# Patient Record
Sex: MALE | Race: WHITE | HISPANIC OR LATINO
[De-identification: names, ages, dates, MRNs, and addresses within clinical notes are randomized per-mention and may not be internally consistent; named-entity substitution may affect disease eponyms.]

---

## 2018-04-02 PROBLEM — E78.00 HYPERCHOLESTEREMIA: Status: ACTIVE | Noted: 2018-04-02

## 2018-04-02 PROBLEM — R07.89 OTHER CHEST PAIN: Status: RESOLVED | Noted: 2018-04-02 | Resolved: 2018-04-02

## 2018-04-02 PROBLEM — R07.89 OTHER CHEST PAIN: Status: ACTIVE | Noted: 2018-04-02

## 2018-04-09 ENCOUNTER — HOSPITAL (OUTPATIENT)
Dept: OTHER | Age: 53
End: 2018-04-09

## 2018-04-09 ENCOUNTER — IMAGING SERVICES (OUTPATIENT)
Dept: OTHER | Age: 53
End: 2018-04-09

## 2018-07-27 PROBLEM — R39.15 BENIGN PROSTATIC HYPERPLASIA (BPH) WITH URINARY URGENCY: Status: ACTIVE | Noted: 2018-07-27

## 2018-07-27 PROBLEM — N40.1 BENIGN PROSTATIC HYPERPLASIA (BPH) WITH URINARY URGENCY: Status: ACTIVE | Noted: 2018-07-27

## 2018-07-27 PROBLEM — H83.3X3: Status: ACTIVE | Noted: 2018-07-27

## 2018-07-28 PROBLEM — D17.1 LIPOMA OF CHEST WALL: Status: ACTIVE | Noted: 2018-07-28

## 2018-12-20 PROCEDURE — 86141 C-REACTIVE PROTEIN HS: CPT | Performed by: INTERNAL MEDICINE

## 2019-05-04 PROBLEM — M26.623 BILATERAL TEMPOROMANDIBULAR JOINT PAIN: Status: ACTIVE | Noted: 2019-05-04

## 2020-12-03 PROBLEM — L64.9 MALE PATTERN BALDNESS: Status: ACTIVE | Noted: 2020-12-03

## 2020-12-03 PROBLEM — M47.812 CERVICAL SPONDYLOSIS: Status: ACTIVE | Noted: 2020-12-03

## 2021-01-04 PROBLEM — G47.33 OSA (OBSTRUCTIVE SLEEP APNEA): Status: ACTIVE | Noted: 2021-01-04

## 2021-12-27 PROBLEM — H83.3X3: Status: RESOLVED | Noted: 2018-07-27 | Resolved: 2021-12-27

## 2021-12-27 PROBLEM — L64.9 MALE PATTERN BALDNESS: Status: RESOLVED | Noted: 2020-12-03 | Resolved: 2021-12-27

## 2021-12-27 PROBLEM — D17.1 LIPOMA OF CHEST WALL: Status: RESOLVED | Noted: 2018-07-28 | Resolved: 2021-12-27

## 2021-12-27 PROBLEM — M26.623 BILATERAL TEMPOROMANDIBULAR JOINT PAIN: Status: RESOLVED | Noted: 2019-05-04 | Resolved: 2021-12-27

## 2024-09-17 ENCOUNTER — HOSPITAL ENCOUNTER (EMERGENCY)
Facility: HOSPITAL | Age: 59
Discharge: HOME | End: 2024-09-17
Attending: EMERGENCY MEDICINE
Payer: COMMERCIAL

## 2024-09-17 ENCOUNTER — APPOINTMENT (OUTPATIENT)
Dept: MRI IMAGING | Facility: HOSPITAL | Age: 59
End: 2024-09-17
Attending: EMERGENCY MEDICINE
Payer: COMMERCIAL

## 2024-09-17 ENCOUNTER — APPOINTMENT (OUTPATIENT)
Dept: CT IMAGING | Facility: HOSPITAL | Age: 59
End: 2024-09-17
Attending: EMERGENCY MEDICINE
Payer: COMMERCIAL

## 2024-09-17 ENCOUNTER — APPOINTMENT (OUTPATIENT)
Dept: GENERAL RADIOLOGY | Facility: HOSPITAL | Age: 59
End: 2024-09-17
Attending: EMERGENCY MEDICINE
Payer: COMMERCIAL

## 2024-09-17 VITALS
SYSTOLIC BLOOD PRESSURE: 127 MMHG | DIASTOLIC BLOOD PRESSURE: 86 MMHG | HEART RATE: 57 BPM | BODY MASS INDEX: 23.7 KG/M2 | OXYGEN SATURATION: 98 % | WEIGHT: 160 LBS | HEIGHT: 69 IN | RESPIRATION RATE: 17 BRPM | TEMPERATURE: 97 F

## 2024-09-17 DIAGNOSIS — H53.2 DIPLOPIA: Primary | ICD-10-CM

## 2024-09-17 LAB
ALBUMIN SERPL-MCNC: 4.3 G/DL (ref 3.2–4.8)
ALBUMIN/GLOB SERPL: 1.7 {RATIO} (ref 1–2)
ALP LIVER SERPL-CCNC: 65 U/L
ALT SERPL-CCNC: 30 U/L
ANION GAP SERPL CALC-SCNC: 6 MMOL/L (ref 0–18)
AST SERPL-CCNC: 26 U/L (ref ?–34)
BASOPHILS # BLD AUTO: 0.04 X10(3) UL (ref 0–0.2)
BASOPHILS NFR BLD AUTO: 0.8 %
BILIRUB SERPL-MCNC: 0.5 MG/DL (ref 0.2–1.1)
BUN BLD-MCNC: 19 MG/DL (ref 9–23)
CALCIUM BLD-MCNC: 9.6 MG/DL (ref 8.7–10.4)
CHLORIDE SERPL-SCNC: 109 MMOL/L (ref 98–112)
CO2 SERPL-SCNC: 27 MMOL/L (ref 21–32)
CREAT BLD-MCNC: 0.85 MG/DL
EGFRCR SERPLBLD CKD-EPI 2021: 99 ML/MIN/1.73M2 (ref 60–?)
EOSINOPHIL # BLD AUTO: 0.16 X10(3) UL (ref 0–0.7)
EOSINOPHIL NFR BLD AUTO: 3.2 %
ERYTHROCYTE [DISTWIDTH] IN BLOOD BY AUTOMATED COUNT: 13.4 %
GLOBULIN PLAS-MCNC: 2.5 G/DL (ref 2–3.5)
GLUCOSE BLD-MCNC: 89 MG/DL (ref 70–99)
HCT VFR BLD AUTO: 39.9 %
HGB BLD-MCNC: 13.9 G/DL
IMM GRANULOCYTES # BLD AUTO: 0.01 X10(3) UL (ref 0–1)
IMM GRANULOCYTES NFR BLD: 0.2 %
LYMPHOCYTES # BLD AUTO: 1.63 X10(3) UL (ref 1–4)
LYMPHOCYTES NFR BLD AUTO: 32.4 %
MCH RBC QN AUTO: 31.2 PG (ref 26–34)
MCHC RBC AUTO-ENTMCNC: 34.8 G/DL (ref 31–37)
MCV RBC AUTO: 89.7 FL
MONOCYTES # BLD AUTO: 0.5 X10(3) UL (ref 0.1–1)
MONOCYTES NFR BLD AUTO: 9.9 %
NEUTROPHILS # BLD AUTO: 2.69 X10 (3) UL (ref 1.5–7.7)
NEUTROPHILS # BLD AUTO: 2.69 X10(3) UL (ref 1.5–7.7)
NEUTROPHILS NFR BLD AUTO: 53.5 %
OSMOLALITY SERPL CALC.SUM OF ELEC: 296 MOSM/KG (ref 275–295)
PLATELET # BLD AUTO: 178 10(3)UL (ref 150–450)
POTASSIUM SERPL-SCNC: 4 MMOL/L (ref 3.5–5.1)
PROT SERPL-MCNC: 6.8 G/DL (ref 5.7–8.2)
RBC # BLD AUTO: 4.45 X10(6)UL
SODIUM SERPL-SCNC: 142 MMOL/L (ref 136–145)
TROPONIN I SERPL HS-MCNC: 3 NG/L
WBC # BLD AUTO: 5 X10(3) UL (ref 4–11)

## 2024-09-17 PROCEDURE — 99285 EMERGENCY DEPT VISIT HI MDM: CPT

## 2024-09-17 PROCEDURE — 70496 CT ANGIOGRAPHY HEAD: CPT | Performed by: EMERGENCY MEDICINE

## 2024-09-17 PROCEDURE — 84484 ASSAY OF TROPONIN QUANT: CPT | Performed by: EMERGENCY MEDICINE

## 2024-09-17 PROCEDURE — 71045 X-RAY EXAM CHEST 1 VIEW: CPT | Performed by: EMERGENCY MEDICINE

## 2024-09-17 PROCEDURE — 96374 THER/PROPH/DIAG INJ IV PUSH: CPT

## 2024-09-17 PROCEDURE — 93005 ELECTROCARDIOGRAM TRACING: CPT

## 2024-09-17 PROCEDURE — A9575 INJ GADOTERATE MEGLUMI 0.1ML: HCPCS | Performed by: EMERGENCY MEDICINE

## 2024-09-17 PROCEDURE — 70553 MRI BRAIN STEM W/O & W/DYE: CPT | Performed by: EMERGENCY MEDICINE

## 2024-09-17 PROCEDURE — 80053 COMPREHEN METABOLIC PANEL: CPT | Performed by: EMERGENCY MEDICINE

## 2024-09-17 PROCEDURE — 93010 ELECTROCARDIOGRAM REPORT: CPT

## 2024-09-17 PROCEDURE — 85025 COMPLETE CBC W/AUTO DIFF WBC: CPT | Performed by: EMERGENCY MEDICINE

## 2024-09-17 RX ORDER — GADOTERATE MEGLUMINE 376.9 MG/ML
15 INJECTION INTRAVENOUS
Status: COMPLETED | OUTPATIENT
Start: 2024-09-17 | End: 2024-09-17

## 2024-09-17 RX ORDER — DIAZEPAM 10 MG/2ML
5 INJECTION, SOLUTION INTRAMUSCULAR; INTRAVENOUS ONCE
Status: COMPLETED | OUTPATIENT
Start: 2024-09-17 | End: 2024-09-17

## 2024-09-17 RX ORDER — DIAZEPAM 5 MG
5 TABLET ORAL ONCE
Status: DISCONTINUED | OUTPATIENT
Start: 2024-09-17 | End: 2024-09-17

## 2024-09-18 LAB
ATRIAL RATE: 55 BPM
P AXIS: 32 DEGREES
P-R INTERVAL: 140 MS
Q-T INTERVAL: 382 MS
QRS DURATION: 100 MS
QTC CALCULATION (BEZET): 365 MS
R AXIS: -9 DEGREES
T AXIS: 29 DEGREES
VENTRICULAR RATE: 55 BPM

## 2024-09-18 NOTE — ED INITIAL ASSESSMENT (HPI)
Patient reports double vision since 1200pm. Denies any chest pain, shortness of breath. Patient reports when it happened he was underneath the car working and was straining his neck. No numbness or tingling. No difficulty speaking or difficulty with ambulation.

## 2024-09-18 NOTE — DISCHARGE INSTRUCTIONS
Return for new or worsening symptoms such as headache, further vision changes, difficulty with speech

## 2024-09-18 NOTE — ED PROVIDER NOTES
Patient Seen in: [facility] Emergency Department      History     Chief Complaint   Patient presents with    Pain     Stated Complaint: right neck pain, double vision since 1200    Subjective:   HPI    59-year-old with a history of sleep apnea presents for evaluation of double vision  Patient states that he developed  double visio today. Has been constant all day. No HA. Does have neck pain.  No trouble with speech. No weakness or numbness in arms or legs. No shortness of breath; had some \"slight\" chest elliot in this anterior chest on the way home from work today - lasted a minutes and then resolved.   Recent records reviewed including office visit from 8/30/2024.  Patient seen for flulike symptoms and intermittent chest pain.  Objective:   Past Medical History:    KULWANT (obstructive sleep apnea)    AHI 23 Supine AHI 27 Non-supine AHI 0 O2 Albin 78%              Past Surgical History:   Procedure Laterality Date    Appendectomy  1979    Other surgical history                  Social History     Socioeconomic History    Marital status:    Tobacco Use    Smoking status: Never    Smokeless tobacco: Never   Substance and Sexual Activity    Alcohol use: Yes    Drug use: Never              Review of Systems    Positive for stated Chief Complaint: Pain    Other systems are as noted in HPI.  Constitutional and vital signs reviewed.      All other systems reviewed and negative except as noted above.    Physical Exam     ED Triage Vitals [09/17/24 1953]   BP (!) 172/76   Pulse 60   Resp 16   Temp 97 °F (36.1 °C)   Temp src    SpO2 97 %   O2 Device None (Room air)       Current Vitals:   Vital Signs  BP: 127/86  Pulse: 57  Resp: 17  Temp: 97 °F (36.1 °C)  MAP (mmHg): 99    Oxygen Therapy  SpO2: 98 %  O2 Device: None (Room air)            Physical Exam    General: Patient is awake, alert in no acute distress.   HEENT:  Pupils are PERRL. Extraocular muscles are intact.  Sclera are not icteric.  Conjunctivae within  normal limits.  Mucous members are moist.   Cardiovascular: Regular rate and rhythm, normal S1-S2.  Respiratory: Lungs are clear to auscultation bilaterally.   Extremities: No edema.  Neuro: Cranial nerves II through XII are intact bilaterally.  No dysarthria or aphasia.  Visual fields intact.  No pronator drift.  Normal finger-nose-finger.  Normal strength and sensation bilateral UE and LE.  Patient is alert and answers questions appropriately    ED Course     Labs Reviewed   COMP METABOLIC PANEL (14) - Abnormal; Notable for the following components:       Result Value    Calculated Osmolality 296 (*)     All other components within normal limits   TROPONIN I HIGH SENSITIVITY - Normal   CBC WITH DIFFERENTIAL WITH PLATELET   RAINBOW DRAW BLUE     EKG    Rate, intervals and axes as noted on EKG Report.  Rate: 55  Rhythm: Sinus Rhythm  Reading: No ST elevation or depression.  No dysrhythmia.         X-ray: I personally reviewed the radiographs and my individual interpretation shows no consolidation.  I also reviewed the official report which showed linear opacity right lower lobe likely atelectasis.  CTA brain: No acute process  MRI brain: No acute findings.  Small area of encephalomalacia in the left parietal lobe                 MDM      Previous records reviewed as noted in HPI    Differential includes, but is not limited to, stroke, intracranial mass, TIA    Review of any laboratory testing: CBC, CMP, troponin normal.     Review of any radiographic studies: Chest x-ray unremarkable.  CTA brain, MRI brain with and without contrast unremarkable    Shared decision making with the patient.  Evaluation diplopia is improved.  Recommend patient follow-up with neurology and ophthalmology as an outpatient.  Should return for increasing or worsening symptoms in the meantime.                                                        Medical Decision Making      Disposition and Plan     Clinical Impression:  1. Diplopia          Disposition:  Discharge  9/17/2024 11:05 pm    Follow-up:  [facility], [facility]  [address]  [facility] [address] [phone]  Schedule an appointment as soon as possible for a visit in 3 day(s)      Scout Singleton MD  [address]  [facility] [address] [phone]    Schedule an appointment as soon as possible for a visit      Gael Pittman MD  [address]  [facility] [address] [phone]    Schedule an appointment as soon as possible for a visit            Medications Prescribed:  There are no discharge medications for this patient.

## 2024-09-18 NOTE — ED PROVIDER NOTES
Patient taken over from previous physician pending results of MRI.  Patient experienced diplopia throughout the day today.  If MRI is unremarkable plan for discharge home with follow-up with neurology and Optho.